# Patient Record
Sex: MALE | Race: BLACK OR AFRICAN AMERICAN | Employment: FULL TIME | URBAN - METROPOLITAN AREA
[De-identification: names, ages, dates, MRNs, and addresses within clinical notes are randomized per-mention and may not be internally consistent; named-entity substitution may affect disease eponyms.]

---

## 2021-09-21 ENCOUNTER — HOSPITAL ENCOUNTER (INPATIENT)
Age: 51
LOS: 3 days | Discharge: HOME | DRG: 439 | End: 2021-09-24
Attending: STUDENT IN AN ORGANIZED HEALTH CARE EDUCATION/TRAINING PROGRAM | Admitting: FAMILY MEDICINE

## 2021-09-21 DIAGNOSIS — K85.20 ALCOHOL-INDUCED ACUTE PANCREATITIS WITHOUT INFECTION OR NECROSIS: Primary | ICD-10-CM

## 2021-09-21 DIAGNOSIS — R45.851 SUICIDAL IDEATION: ICD-10-CM

## 2021-09-21 PROBLEM — K85.90 PANCREATITIS: Status: ACTIVE | Noted: 2021-09-21

## 2021-09-21 LAB
ALBUMIN SERPL-MCNC: 4.2 G/DL (ref 3.5–5)
ALBUMIN/GLOB SERPL: 1 {RATIO} (ref 1.1–2.2)
ALP SERPL-CCNC: 117 U/L (ref 45–117)
ALT SERPL-CCNC: 79 U/L (ref 12–78)
AMPHET UR QL SCN: NEGATIVE
ANION GAP SERPL CALC-SCNC: 16 MMOL/L (ref 5–15)
APPEARANCE UR: CLEAR
AST SERPL W P-5'-P-CCNC: 173 U/L (ref 15–37)
BACTERIA URNS QL MICRO: NEGATIVE /HPF
BARBITURATES UR QL SCN: NEGATIVE
BASOPHILS # BLD: 0 K/UL (ref 0–0.1)
BASOPHILS NFR BLD: 1 % (ref 0–1)
BENZODIAZ UR QL: NEGATIVE
BILIRUB SERPL-MCNC: 0.7 MG/DL (ref 0.2–1)
BILIRUB UR QL: NEGATIVE
BUN SERPL-MCNC: 7 MG/DL (ref 6–20)
BUN/CREAT SERPL: 6 (ref 12–20)
CA-I BLD-MCNC: 9.1 MG/DL (ref 8.5–10.1)
CANNABINOIDS UR QL SCN: NEGATIVE
CHLORIDE SERPL-SCNC: 91 MMOL/L (ref 97–108)
CO2 SERPL-SCNC: 24 MMOL/L (ref 21–32)
COCAINE UR QL SCN: NEGATIVE
COLOR UR: ABNORMAL
CREAT SERPL-MCNC: 1.09 MG/DL (ref 0.7–1.3)
DIFFERENTIAL METHOD BLD: NORMAL
DRUG SCRN COMMENT,DRGCM: NORMAL
EOSINOPHIL # BLD: 0 K/UL (ref 0–0.4)
EOSINOPHIL NFR BLD: 0 % (ref 0–7)
ERYTHROCYTE [DISTWIDTH] IN BLOOD BY AUTOMATED COUNT: 13.6 % (ref 11.5–14.5)
ETHANOL SERPL-MCNC: 206 MG/DL
GLOBULIN SER CALC-MCNC: 4.3 G/DL (ref 2–4)
GLUCOSE SERPL-MCNC: 144 MG/DL (ref 65–100)
GLUCOSE UR STRIP.AUTO-MCNC: NEGATIVE MG/DL
HCT VFR BLD AUTO: 38.7 % (ref 36.6–50.3)
HGB BLD-MCNC: 13.4 G/DL (ref 12.1–17)
HGB UR QL STRIP: ABNORMAL
IMM GRANULOCYTES # BLD AUTO: 0 K/UL (ref 0–0.04)
IMM GRANULOCYTES NFR BLD AUTO: 0 % (ref 0–0.5)
KETONES UR QL STRIP.AUTO: 20 MG/DL
LEUKOCYTE ESTERASE UR QL STRIP.AUTO: ABNORMAL
LIPASE SERPL-CCNC: 577 U/L (ref 73–393)
LYMPHOCYTES # BLD: 1.2 K/UL (ref 0.8–3.5)
LYMPHOCYTES NFR BLD: 18 % (ref 12–49)
MAGNESIUM SERPL-MCNC: 1.9 MG/DL (ref 1.6–2.4)
MCH RBC QN AUTO: 29.1 PG (ref 26–34)
MCHC RBC AUTO-ENTMCNC: 34.6 G/DL (ref 30–36.5)
MCV RBC AUTO: 84.1 FL (ref 80–99)
METHADONE UR QL: NEGATIVE
MONOCYTES # BLD: 0.5 K/UL (ref 0–1)
MONOCYTES NFR BLD: 7 % (ref 5–13)
NEUTS SEG # BLD: 4.9 K/UL (ref 1.8–8)
NEUTS SEG NFR BLD: 74 % (ref 32–75)
NITRITE UR QL STRIP.AUTO: NEGATIVE
NRBC # BLD: 0 K/UL (ref 0–0.01)
NRBC BLD-RTO: 0 PER 100 WBC
OPIATES UR QL: NEGATIVE
PCP UR QL: NEGATIVE
PH UR STRIP: 6 [PH] (ref 5–8)
PLATELET # BLD AUTO: 198 K/UL (ref 150–400)
PMV BLD AUTO: 10.2 FL (ref 8.9–12.9)
POTASSIUM SERPL-SCNC: 3.4 MMOL/L (ref 3.5–5.1)
PROT SERPL-MCNC: 8.5 G/DL (ref 6.4–8.2)
PROT UR STRIP-MCNC: 30 MG/DL
RBC # BLD AUTO: 4.6 M/UL (ref 4.1–5.7)
RBC #/AREA URNS HPF: ABNORMAL /HPF (ref 0–5)
SODIUM SERPL-SCNC: 131 MMOL/L (ref 136–145)
SP GR UR REFRACTOMETRY: 1.01 (ref 1–1.03)
UA: UC IF INDICATED,UAUC: ABNORMAL
UROBILINOGEN UR QL STRIP.AUTO: 2 EU/DL (ref 0.1–1)
WBC # BLD AUTO: 6.7 K/UL (ref 4.1–11.1)
WBC URNS QL MICRO: ABNORMAL /HPF (ref 0–4)

## 2021-09-21 PROCEDURE — 96374 THER/PROPH/DIAG INJ IV PUSH: CPT

## 2021-09-21 PROCEDURE — 80307 DRUG TEST PRSMV CHEM ANLYZR: CPT

## 2021-09-21 PROCEDURE — 74011250636 HC RX REV CODE- 250/636: Performed by: STUDENT IN AN ORGANIZED HEALTH CARE EDUCATION/TRAINING PROGRAM

## 2021-09-21 PROCEDURE — 80053 COMPREHEN METABOLIC PANEL: CPT

## 2021-09-21 PROCEDURE — 85025 COMPLETE CBC W/AUTO DIFF WBC: CPT

## 2021-09-21 PROCEDURE — 83690 ASSAY OF LIPASE: CPT

## 2021-09-21 PROCEDURE — 36415 COLL VENOUS BLD VENIPUNCTURE: CPT

## 2021-09-21 PROCEDURE — 82077 ASSAY SPEC XCP UR&BREATH IA: CPT

## 2021-09-21 PROCEDURE — 74011250637 HC RX REV CODE- 250/637: Performed by: STUDENT IN AN ORGANIZED HEALTH CARE EDUCATION/TRAINING PROGRAM

## 2021-09-21 PROCEDURE — 65270000029 HC RM PRIVATE

## 2021-09-21 PROCEDURE — 83735 ASSAY OF MAGNESIUM: CPT

## 2021-09-21 PROCEDURE — 74011000258 HC RX REV CODE- 258: Performed by: STUDENT IN AN ORGANIZED HEALTH CARE EDUCATION/TRAINING PROGRAM

## 2021-09-21 PROCEDURE — 99284 EMERGENCY DEPT VISIT MOD MDM: CPT

## 2021-09-21 PROCEDURE — 81001 URINALYSIS AUTO W/SCOPE: CPT

## 2021-09-21 RX ORDER — DEXTROSE MONOHYDRATE AND SODIUM CHLORIDE 5; .9 G/100ML; G/100ML
150 INJECTION, SOLUTION INTRAVENOUS CONTINUOUS
Status: DISCONTINUED | OUTPATIENT
Start: 2021-09-21 | End: 2021-09-22

## 2021-09-21 RX ORDER — POTASSIUM CHLORIDE 20 MEQ/1
40 TABLET, EXTENDED RELEASE ORAL
Status: COMPLETED | OUTPATIENT
Start: 2021-09-21 | End: 2021-09-21

## 2021-09-21 RX ORDER — FOLIC ACID 1 MG/1
1 TABLET ORAL DAILY
Status: DISCONTINUED | OUTPATIENT
Start: 2021-09-22 | End: 2021-09-24 | Stop reason: HOSPADM

## 2021-09-21 RX ADMIN — SODIUM CHLORIDE 1000 ML: 9 INJECTION, SOLUTION INTRAVENOUS at 18:28

## 2021-09-21 RX ADMIN — THIAMINE HYDROCHLORIDE 100 MG: 100 INJECTION, SOLUTION INTRAMUSCULAR; INTRAVENOUS at 18:26

## 2021-09-21 RX ADMIN — DEXTROSE AND SODIUM CHLORIDE 150 ML/HR: 5; 900 INJECTION, SOLUTION INTRAVENOUS at 20:45

## 2021-09-21 RX ADMIN — SODIUM CHLORIDE 1000 ML: 9 INJECTION, SOLUTION INTRAVENOUS at 23:35

## 2021-09-21 RX ADMIN — POTASSIUM CHLORIDE 40 MEQ: 1500 TABLET, EXTENDED RELEASE ORAL at 23:34
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
## 2021-09-22 ENCOUNTER — APPOINTMENT (OUTPATIENT)
Dept: CT IMAGING | Age: 51
DRG: 439 | End: 2021-09-22
Attending: FAMILY MEDICINE

## 2021-09-22 LAB
AMYLASE SERPL-CCNC: 61 U/L (ref 25–115)
BASOPHILS # BLD: 0 K/UL (ref 0–0.1)
BASOPHILS NFR BLD: 0 % (ref 0–1)
DIFFERENTIAL METHOD BLD: ABNORMAL
EOSINOPHIL # BLD: 0 K/UL (ref 0–0.4)
EOSINOPHIL NFR BLD: 1 % (ref 0–7)
ERYTHROCYTE [DISTWIDTH] IN BLOOD BY AUTOMATED COUNT: 14 % (ref 11.5–14.5)
HCT VFR BLD AUTO: 38.3 % (ref 36.6–50.3)
HGB BLD-MCNC: 12.6 G/DL (ref 12.1–17)
IMM GRANULOCYTES # BLD AUTO: 0 K/UL (ref 0–0.04)
IMM GRANULOCYTES NFR BLD AUTO: 1 % (ref 0–0.5)
LIPASE SERPL-CCNC: 717 U/L (ref 73–393)
LYMPHOCYTES # BLD: 0.9 K/UL (ref 0.8–3.5)
LYMPHOCYTES NFR BLD: 17 % (ref 12–49)
MCH RBC QN AUTO: 28.6 PG (ref 26–34)
MCHC RBC AUTO-ENTMCNC: 32.9 G/DL (ref 30–36.5)
MCV RBC AUTO: 86.8 FL (ref 80–99)
MONOCYTES # BLD: 0.4 K/UL (ref 0–1)
MONOCYTES NFR BLD: 8 % (ref 5–13)
NEUTS SEG # BLD: 4.2 K/UL (ref 1.8–8)
NEUTS SEG NFR BLD: 73 % (ref 32–75)
NRBC # BLD: 0 K/UL (ref 0–0.01)
NRBC BLD-RTO: 0 PER 100 WBC
PLATELET # BLD AUTO: 148 K/UL (ref 150–400)
PMV BLD AUTO: 10.6 FL (ref 8.9–12.9)
RBC # BLD AUTO: 4.41 M/UL (ref 4.1–5.7)
WBC # BLD AUTO: 5.6 K/UL (ref 4.1–11.1)

## 2021-09-22 PROCEDURE — 74011250637 HC RX REV CODE- 250/637: Performed by: PSYCHIATRY & NEUROLOGY

## 2021-09-22 PROCEDURE — 74176 CT ABD & PELVIS W/O CONTRAST: CPT

## 2021-09-22 PROCEDURE — 74011000250 HC RX REV CODE- 250: Performed by: FAMILY MEDICINE

## 2021-09-22 PROCEDURE — 83690 ASSAY OF LIPASE: CPT

## 2021-09-22 PROCEDURE — 65270000029 HC RM PRIVATE

## 2021-09-22 PROCEDURE — 74011000636 HC RX REV CODE- 636: Performed by: FAMILY MEDICINE

## 2021-09-22 PROCEDURE — 36415 COLL VENOUS BLD VENIPUNCTURE: CPT

## 2021-09-22 PROCEDURE — 85025 COMPLETE CBC W/AUTO DIFF WBC: CPT

## 2021-09-22 PROCEDURE — 74011250636 HC RX REV CODE- 250/636: Performed by: FAMILY MEDICINE

## 2021-09-22 PROCEDURE — 74011000258 HC RX REV CODE- 258: Performed by: STUDENT IN AN ORGANIZED HEALTH CARE EDUCATION/TRAINING PROGRAM

## 2021-09-22 PROCEDURE — 82150 ASSAY OF AMYLASE: CPT

## 2021-09-22 PROCEDURE — 74011250637 HC RX REV CODE- 250/637: Performed by: FAMILY MEDICINE

## 2021-09-22 RX ORDER — MIRTAZAPINE 15 MG/1
15 TABLET, FILM COATED ORAL
Status: DISCONTINUED | OUTPATIENT
Start: 2021-09-22 | End: 2021-09-24 | Stop reason: HOSPADM

## 2021-09-22 RX ORDER — CHOLECALCIFEROL (VITAMIN D3) 125 MCG
5 CAPSULE ORAL
Status: DISCONTINUED | OUTPATIENT
Start: 2021-09-22 | End: 2021-09-24 | Stop reason: HOSPADM

## 2021-09-22 RX ORDER — PSEUDOEPHED/ACETAMINOPHEN/CPM 30-500-2MG
2 TABLET ORAL
Status: DISCONTINUED | OUTPATIENT
Start: 2021-09-22 | End: 2021-09-24 | Stop reason: HOSPADM

## 2021-09-22 RX ORDER — ONDANSETRON 2 MG/ML
4 INJECTION INTRAMUSCULAR; INTRAVENOUS
Status: DISCONTINUED | OUTPATIENT
Start: 2021-09-22 | End: 2021-09-24 | Stop reason: HOSPADM

## 2021-09-22 RX ORDER — LORAZEPAM 2 MG/ML
2 INJECTION INTRAMUSCULAR
Status: DISCONTINUED | OUTPATIENT
Start: 2021-09-22 | End: 2021-09-24 | Stop reason: HOSPADM

## 2021-09-22 RX ORDER — PRAZOSIN HYDROCHLORIDE 1 MG/1
1 CAPSULE ORAL
Status: DISCONTINUED | OUTPATIENT
Start: 2021-09-22 | End: 2021-09-24 | Stop reason: HOSPADM

## 2021-09-22 RX ORDER — SERTRALINE HYDROCHLORIDE 25 MG/1
25 TABLET, FILM COATED ORAL DAILY
Status: DISCONTINUED | OUTPATIENT
Start: 2021-09-23 | End: 2021-09-24 | Stop reason: HOSPADM

## 2021-09-22 RX ADMIN — PRAZOSIN HYDROCHLORIDE 1 MG: 1 CAPSULE ORAL at 22:33

## 2021-09-22 RX ADMIN — MELATONIN TAB 5 MG 5 MG: 5 TAB at 03:10

## 2021-09-22 RX ADMIN — DIATRIZOATE MEGLUMINE AND DIATRIZOATE SODIUM 30 ML: 660; 100 LIQUID ORAL; RECTAL at 09:25

## 2021-09-22 RX ADMIN — DEXTROSE AND SODIUM CHLORIDE 150 ML/HR: 5; 900 INJECTION, SOLUTION INTRAVENOUS at 06:12

## 2021-09-22 RX ADMIN — MIRTAZAPINE 15 MG: 15 TABLET, FILM COATED ORAL at 22:33

## 2021-09-22 RX ADMIN — ONDANSETRON 4 MG: 2 INJECTION INTRAMUSCULAR; INTRAVENOUS at 09:25

## 2021-09-22 RX ADMIN — THIAMINE HYDROCHLORIDE: 100 INJECTION, SOLUTION INTRAMUSCULAR; INTRAVENOUS at 11:57

## 2021-09-22 NOTE — ED NOTES
Patient placed in green gown. Room is psyc safe. Patient belongings placed in nourishment room at station 3:  Black shirt, camo pants, black shoes, one black bag with belongings.
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
## 2021-09-22 NOTE — PROGRESS NOTES
Primary Nurse Elina Laughlin, MAKAYLA and Angelica Arreola RN performed a dual skin assessment on this patient No impairment noted  Anthony score is 22.

## 2021-09-22 NOTE — ED NOTES
.. TRANSFER - OUT REPORT:    Verbal report given to MAKAYLA Antoine(name) on José Manuel Hickey  being transferred to [facility](unit) for routine progression of care       Report consisted of patients Situation, Background, Assessment and   Recommendations(SBAR). Information from the following report(s) SBAR was reviewed with the receiving nurse. Lines:   Peripheral IV 09/21/21 Right Antecubital (Active)        Opportunity for questions and clarification was provided.       Patient transported with:   Registered Nurse
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
## 2021-09-22 NOTE — PROGRESS NOTES
Bedside and Verbal shift change report given to Renee Sauer RN (oncoming nurse) by Randa Hadley RN (offgoing nurse). Report included the following information SBAR, Kardex, Intake/Output, Recent Results, Med Rec Status and Cardiac Rhythm NSR.

## 2021-09-22 NOTE — H&P
History and Physical    NAME: Iliana Trujillo   :  1970   MRN:  [id number]     Date/Time:  2021 8:40 AM    Patient PCP: None  ______________________________________________________________________             Subjective:     CHIEF COMPLAINT:     Suicidal ideation    HISTORY OF PRESENT ILLNESS:       Patient is a 45y.o. year old male history of heavy alcohol use M to the ER with depression and suicidal ideation also history of posttraumatic stress disorder recently because of depression he is drinking heavily liquor every suicidal ideation came to emergency room seen by the ER physician initially plan for admitted to psych floor as patient lipase is elevated suspect acute pancreatitis patient was admitted to medical floor      No abdominal CT scan was ordered    History reviewed. No pertinent past medical history. None      History reviewed. No pertinent surgical history. Social History     Tobacco Use    Smoking status: Current Some Day Smoker    Smokeless tobacco: Never Used    Tobacco comment: cigar   Substance Use Topics    Alcohol use: Yes     Comment: daily         History reviewed. No pertinent family history. No Known Allergies     Prior to Admission medications    Not on File         Current Facility-Administered Medications:     melatonin tablet 5 mg, 5 mg, Oral, QHS PRN, Aminta Suárez MD, 5 mg at 21 0310    ondansetron (ZOFRAN) injection 4 mg, 4 mg, IntraVENous, Q6H PRN, Aminta Suárez MD    0.9% sodium chloride 1,000 mL with mvi (adult no. 4 with vit K) 10 mL, thiamine 968 mg, folic acid 1 mg infusion, , IntraVENous, Q24H, Aminta Suárez MD    LORazepam (ATIVAN) injection 2 mg, 2 mg, IntraVENous, Q4H PRN, Aminta Suárez MD    folic acid (FOLVITE) tablet 1 mg, 1 mg, Oral, DAILY, Kenroy Frank MD    LAB DATA REVIEWED:    Recent Results (from the past 24 hour(s))   URINALYSIS W/ REFLEX CULTURE    Collection Time: 21  5:00 PM    Specimen: Urine   Result Value Ref Range    Color Yellow/Straw      Appearance Clear Clear      Specific gravity 1.010 1.003 - 1.030      pH (UA) 6.0 5.0 - 8.0      Protein 30 (A) Negative mg/dL    Glucose Negative Negative mg/dL    Ketone 20 (A) Negative mg/dL    Bilirubin Negative Negative      Blood Small (A) Negative      Urobilinogen 2.0 (H) 0.1 - 1.0 EU/dL    Nitrites Negative Negative      Leukocyte Esterase Trace (A) Negative      UA:UC IF INDICATED Culture not indicated by UA result Culture not indicated by UA result      WBC 0-4 0 - 4 /hpf    RBC 0-5 0 - 5 /hpf    Bacteria Negative Negative /hpf   DRUG SCREEN, URINE    Collection Time: 09/21/21  5:00 PM   Result Value Ref Range    AMPHETAMINES Negative Negative      BARBITURATES Negative Negative      BENZODIAZEPINES Negative Negative      COCAINE Negative Negative      METHADONE Negative Negative      OPIATES Negative Negative      PCP(PHENCYCLIDINE) Negative Negative      THC (TH-CANNABINOL) Negative Negative      Drug screen comment        This test is a screen for drugs of abuse in a medical setting only (i.e., they are unconfirmed results and as such must not be used for non-medical purposes, e.g.,employment testing, legal testing). Due to its inherent nature, false positive (FP) and false negative (FN) results may be obtained. Therefore, if necessary for medical care, recommend confirmation of positive findings by GC/MS.    CBC WITH AUTOMATED DIFF    Collection Time: 09/21/21  5:05 PM   Result Value Ref Range    WBC 6.7 4.1 - 11.1 K/uL    RBC 4.60 4.10 - 5.70 M/uL    HGB 13.4 12.1 - 17.0 g/dL    HCT 38.7 36.6 - 50.3 %    MCV 84.1 80.0 - 99.0 FL    MCH 29.1 26.0 - 34.0 PG    MCHC 34.6 30.0 - 36.5 g/dL    RDW 13.6 11.5 - 14.5 %    PLATELET 169 049 - 642 K/uL    MPV 10.2 8.9 - 12.9 FL    NRBC 0.0 0.0  WBC    ABSOLUTE NRBC 0.00 0.00 - 0.01 K/uL    NEUTROPHILS 74 32 - 75 %    LYMPHOCYTES 18 12 - 49 %    MONOCYTES 7 5 - 13 %    EOSINOPHILS 0 0 - 7 %    BASOPHILS 1 0 - 1 % IMMATURE GRANULOCYTES 0 0 - 0.5 %    ABS. NEUTROPHILS 4.9 1.8 - 8.0 K/UL    ABS. LYMPHOCYTES 1.2 0.8 - 3.5 K/UL    ABS. MONOCYTES 0.5 0.0 - 1.0 K/UL    ABS. EOSINOPHILS 0.0 0.0 - 0.4 K/UL    ABS. BASOPHILS 0.0 0.0 - 0.1 K/UL    ABS. IMM. GRANS. 0.0 0.00 - 0.04 K/UL    DF AUTOMATED     METABOLIC PANEL, COMPREHENSIVE    Collection Time: 09/21/21  5:05 PM   Result Value Ref Range    Sodium 131 (L) 136 - 145 mmol/L    Potassium 3.4 (L) 3.5 - 5.1 mmol/L    Chloride 91 (L) 97 - 108 mmol/L    CO2 24 21 - 32 mmol/L    Anion gap 16 (H) 5 - 15 mmol/L    Glucose 144 (H) 65 - 100 mg/dL    BUN 7 6 - 20 mg/dL    Creatinine 1.09 0.70 - 1.30 mg/dL    BUN/Creatinine ratio 6 (L) 12 - 20      GFR est AA >60 >60 ml/min/1.73m2    GFR est non-AA >60 >60 ml/min/1.73m2    Calcium 9.1 8.5 - 10.1 mg/dL    Bilirubin, total 0.7 0.2 - 1.0 mg/dL    AST (SGOT) 173 (H) 15 - 37 U/L    ALT (SGPT) 79 (H) 12 - 78 U/L    Alk. phosphatase 117 45 - 117 U/L    Protein, total 8.5 (H) 6.4 - 8.2 g/dL    Albumin 4.2 3.5 - 5.0 g/dL    Globulin 4.3 (H) 2.0 - 4.0 g/dL    A-G Ratio 1.0 (L) 1.1 - 2.2     ETHYL ALCOHOL    Collection Time: 09/21/21  5:05 PM   Result Value Ref Range    ALCOHOL(ETHYL),SERUM 206 (H) <10 mg/dL   LIPASE    Collection Time: 09/21/21  5:20 PM   Result Value Ref Range    Lipase 577 (H) 73 - 393 U/L   MAGNESIUM    Collection Time: 09/21/21  5:20 PM   Result Value Ref Range    Magnesium 1.9 1.6 - 2.4 mg/dL       XR Results (most recent):  No results found for this or any previous visit. CT ABD PELV WO CONT    (Results Pending)        Review of Systems:  Constitutional: Negative for chills and fever. HENT: Negative. Eyes: Negative. Respiratory: Negative. Cardiovascular: Negative. Gastrointestinal: Nausea vomiting  Skin: Negative. Neurological: Negative.       Objective:   VITALS:    Visit Vitals  BP (!) 154/95 (BP 1 Location: Left upper arm, BP Patient Position: At rest;Semi fowlers)   Pulse 100   Temp 98.7 °F (37.1 °C) Resp 18   Ht 6' 1.5\" (1.867 m)   Wt 117.9 kg (259 lb 14.8 oz)   SpO2 98%   BMI 33.83 kg/m²       Physical Exam:   Constitutional: pt is oriented to person, place, and time. HENT:   Head: Normocephalic and atraumatic. Eyes: Pupils are equal, round, and reactive to light. EOM are normal.   Cardiovascular: Normal rate, regular rhythm and normal heart sounds. Pulmonary/Chest: Breath sounds normal. No wheezes. No rales. Exhibits no tenderness. Abdominal: Soft. Bowel sounds are normal. There is no abdominal tenderness. There is no rebound and no guarding. Musculoskeletal: Normal range of motion. Neurological: pt is alert and oriented to person, place, and time. Alert. Normal strength. No cranial nerve deficit or sensory deficit. Displays a negative Romberg sign.         ASSESSMENT & PLAN:    Suicidal ideation   alcohol withdrawals  Intractable nausea vomiting likely from alcohol withdrawal  Pancreatitis questionable      Start on banana bag   Ativan 2 mg IV every 4 hours as needed for withdrawals  Psychiatric consult for depression and suicidal  Patient placed a one-to-one   CT scan of the abdomen and the pelvis        ________________________________________________________________________    Signed: [id number] Jarad Carrera MD

## 2021-09-23 LAB
ALBUMIN SERPL-MCNC: 3.3 G/DL (ref 3.5–5)
ALBUMIN/GLOB SERPL: 0.9 {RATIO} (ref 1.1–2.2)
ALP SERPL-CCNC: 116 U/L (ref 45–117)
ALT SERPL-CCNC: 85 U/L (ref 12–78)
AMYLASE SERPL-CCNC: 63 U/L (ref 25–115)
ANION GAP SERPL CALC-SCNC: 11 MMOL/L (ref 5–15)
AST SERPL W P-5'-P-CCNC: 183 U/L (ref 15–37)
BASOPHILS # BLD: 0 K/UL (ref 0–0.1)
BASOPHILS NFR BLD: 0 % (ref 0–1)
BILIRUB SERPL-MCNC: 0.6 MG/DL (ref 0.2–1)
BUN SERPL-MCNC: 3 MG/DL (ref 6–20)
BUN/CREAT SERPL: 3 (ref 12–20)
CA-I BLD-MCNC: 9 MG/DL (ref 8.5–10.1)
CHLORIDE SERPL-SCNC: 104 MMOL/L (ref 97–108)
CO2 SERPL-SCNC: 26 MMOL/L (ref 21–32)
CREAT SERPL-MCNC: 0.9 MG/DL (ref 0.7–1.3)
DIFFERENTIAL METHOD BLD: ABNORMAL
EOSINOPHIL # BLD: 0.1 K/UL (ref 0–0.4)
EOSINOPHIL NFR BLD: 2 % (ref 0–7)
ERYTHROCYTE [DISTWIDTH] IN BLOOD BY AUTOMATED COUNT: 14.4 % (ref 11.5–14.5)
GLOBULIN SER CALC-MCNC: 3.7 G/DL (ref 2–4)
GLUCOSE SERPL-MCNC: 106 MG/DL (ref 65–100)
HCT VFR BLD AUTO: 38.1 % (ref 36.6–50.3)
HGB BLD-MCNC: 12.4 G/DL (ref 12.1–17)
IMM GRANULOCYTES # BLD AUTO: 0 K/UL (ref 0–0.04)
IMM GRANULOCYTES NFR BLD AUTO: 1 % (ref 0–0.5)
LIPASE SERPL-CCNC: 556 U/L (ref 73–393)
LYMPHOCYTES # BLD: 1.2 K/UL (ref 0.8–3.5)
LYMPHOCYTES NFR BLD: 24 % (ref 12–49)
MCH RBC QN AUTO: 28.9 PG (ref 26–34)
MCHC RBC AUTO-ENTMCNC: 32.5 G/DL (ref 30–36.5)
MCV RBC AUTO: 88.8 FL (ref 80–99)
MONOCYTES # BLD: 0.4 K/UL (ref 0–1)
MONOCYTES NFR BLD: 7 % (ref 5–13)
NEUTS SEG # BLD: 3.5 K/UL (ref 1.8–8)
NEUTS SEG NFR BLD: 66 % (ref 32–75)
NRBC # BLD: 0 K/UL (ref 0–0.01)
NRBC BLD-RTO: 0 PER 100 WBC
PLATELET # BLD AUTO: 153 K/UL (ref 150–400)
PMV BLD AUTO: 11.1 FL (ref 8.9–12.9)
POTASSIUM SERPL-SCNC: 3.5 MMOL/L (ref 3.5–5.1)
PROT SERPL-MCNC: 7 G/DL (ref 6.4–8.2)
RBC # BLD AUTO: 4.29 M/UL (ref 4.1–5.7)
SODIUM SERPL-SCNC: 141 MMOL/L (ref 136–145)
WBC # BLD AUTO: 5.3 K/UL (ref 4.1–11.1)

## 2021-09-23 PROCEDURE — 65270000029 HC RM PRIVATE

## 2021-09-23 PROCEDURE — 85025 COMPLETE CBC W/AUTO DIFF WBC: CPT

## 2021-09-23 PROCEDURE — 74011000250 HC RX REV CODE- 250: Performed by: FAMILY MEDICINE

## 2021-09-23 PROCEDURE — 74011250637 HC RX REV CODE- 250/637: Performed by: STUDENT IN AN ORGANIZED HEALTH CARE EDUCATION/TRAINING PROGRAM

## 2021-09-23 PROCEDURE — 83690 ASSAY OF LIPASE: CPT

## 2021-09-23 PROCEDURE — 36415 COLL VENOUS BLD VENIPUNCTURE: CPT

## 2021-09-23 PROCEDURE — 74011250636 HC RX REV CODE- 250/636: Performed by: FAMILY MEDICINE

## 2021-09-23 PROCEDURE — 74011250637 HC RX REV CODE- 250/637: Performed by: PSYCHIATRY & NEUROLOGY

## 2021-09-23 PROCEDURE — 74011250637 HC RX REV CODE- 250/637: Performed by: FAMILY MEDICINE

## 2021-09-23 PROCEDURE — 82150 ASSAY OF AMYLASE: CPT

## 2021-09-23 PROCEDURE — 80053 COMPREHEN METABOLIC PANEL: CPT

## 2021-09-23 RX ORDER — AMLODIPINE BESYLATE 5 MG/1
5 TABLET ORAL ONCE
Status: COMPLETED | OUTPATIENT
Start: 2021-09-23 | End: 2021-09-23

## 2021-09-23 RX ORDER — AMLODIPINE BESYLATE 5 MG/1
10 TABLET ORAL DAILY
Status: DISCONTINUED | OUTPATIENT
Start: 2021-09-24 | End: 2021-09-24 | Stop reason: HOSPADM

## 2021-09-23 RX ORDER — AMLODIPINE BESYLATE 5 MG/1
5 TABLET ORAL DAILY
Status: DISCONTINUED | OUTPATIENT
Start: 2021-09-23 | End: 2021-09-23

## 2021-09-23 RX ADMIN — MIRTAZAPINE 15 MG: 15 TABLET, FILM COATED ORAL at 22:25

## 2021-09-23 RX ADMIN — AMLODIPINE BESYLATE 5 MG: 5 TABLET ORAL at 13:26

## 2021-09-23 RX ADMIN — AMLODIPINE BESYLATE 5 MG: 5 TABLET ORAL at 17:35

## 2021-09-23 RX ADMIN — FOLIC ACID 1 MG: 1 TABLET ORAL at 10:19

## 2021-09-23 RX ADMIN — THIAMINE HYDROCHLORIDE: 100 INJECTION, SOLUTION INTRAMUSCULAR; INTRAVENOUS at 10:59

## 2021-09-23 RX ADMIN — SERTRALINE 25 MG: 25 TABLET, FILM COATED ORAL at 10:19

## 2021-09-23 RX ADMIN — PRAZOSIN HYDROCHLORIDE 1 MG: 1 CAPSULE ORAL at 22:25

## 2021-09-23 NOTE — PROGRESS NOTES
General Daily Progress Note          Patient Name:   Jony Sawant       YOB: 1970       Age:  45 y.o. Admit Date: 9/21/2021      Subjective:   Patient is a 45y.o. year old male history of heavy alcohol use M to the ER with depression and suicidal ideation also history of posttraumatic stress disorder recently because of depression he is drinking heavily liquor every suicidal ideation came to emergency room seen by the ER physician initially plan for admitted to psych floor as patient lipase is elevated suspect acute pancreatitis patient was admitted to medical floor    Patient denies any abdominal pain no nausea no vomiting           Objective:     Visit Vitals  BP (!) 161/103 (BP 1 Location: Left lower arm, BP Patient Position: At rest)   Pulse 96   Temp 97.6 °F (36.4 °C)   Resp 18   Ht 6' 1.5\" (1.867 m)   Wt 117.9 kg (259 lb 14.8 oz)   SpO2 99%   BMI 33.83 kg/m²        Recent Results (from the past 24 hour(s))   AMYLASE    Collection Time: 09/22/21 10:40 AM   Result Value Ref Range    Amylase 61 25 - 115 U/L   LIPASE    Collection Time: 09/22/21 10:40 AM   Result Value Ref Range    Lipase 717 (H) 73 - 393 U/L   CBC WITH AUTOMATED DIFF    Collection Time: 09/22/21 10:40 AM   Result Value Ref Range    WBC 5.6 4.1 - 11.1 K/uL    RBC 4.41 4.10 - 5.70 M/uL    HGB 12.6 12.1 - 17.0 g/dL    HCT 38.3 36.6 - 50.3 %    MCV 86.8 80.0 - 99.0 FL    MCH 28.6 26.0 - 34.0 PG    MCHC 32.9 30.0 - 36.5 g/dL    RDW 14.0 11.5 - 14.5 %    PLATELET 035 (L) 434 - 400 K/uL    MPV 10.6 8.9 - 12.9 FL    NRBC 0.0 0.0  WBC    ABSOLUTE NRBC 0.00 0.00 - 0.01 K/uL    NEUTROPHILS 73 32 - 75 %    LYMPHOCYTES 17 12 - 49 %    MONOCYTES 8 5 - 13 %    EOSINOPHILS 1 0 - 7 %    BASOPHILS 0 0 - 1 %    IMMATURE GRANULOCYTES 1 (H) 0 - 0.5 %    ABS. NEUTROPHILS 4.2 1.8 - 8.0 K/UL    ABS. LYMPHOCYTES 0.9 0.8 - 3.5 K/UL    ABS. MONOCYTES 0.4 0.0 - 1.0 K/UL    ABS. EOSINOPHILS 0.0 0.0 - 0.4 K/UL    ABS.  BASOPHILS 0.0 0.0 - 0.1 K/UL    ABS. IMM. GRANS. 0.0 0.00 - 0.04 K/UL    DF AUTOMATED     METABOLIC PANEL, COMPREHENSIVE    Collection Time: 09/23/21  7:56 AM   Result Value Ref Range    Sodium 141 136 - 145 mmol/L    Potassium 3.5 3.5 - 5.1 mmol/L    Chloride 104 97 - 108 mmol/L    CO2 26 21 - 32 mmol/L    Anion gap 11 5 - 15 mmol/L    Glucose 106 (H) 65 - 100 mg/dL    BUN 3 (L) 6 - 20 mg/dL    Creatinine 0.90 0.70 - 1.30 mg/dL    BUN/Creatinine ratio 3 (L) 12 - 20      GFR est AA >60 >60 ml/min/1.73m2    GFR est non-AA >60 >60 ml/min/1.73m2    Calcium 9.0 8.5 - 10.1 mg/dL    Bilirubin, total 0.6 0.2 - 1.0 mg/dL    AST (SGOT) 183 (H) 15 - 37 U/L    ALT (SGPT) 85 (H) 12 - 78 U/L    Alk. phosphatase 116 45 - 117 U/L    Protein, total 7.0 6.4 - 8.2 g/dL    Albumin 3.3 (L) 3.5 - 5.0 g/dL    Globulin 3.7 2.0 - 4.0 g/dL    A-G Ratio 0.9 (L) 1.1 - 2.2       [unfilled]      Review of Systems    Constitutional: Negative for chills and fever. HENT: Negative. Eyes: Negative. Respiratory: Negative. Cardiovascular: Negative. Gastrointestinal: Negative for abdominal pain and nausea. Skin: Negative. Neurological: Negative. Physical Exam:      Constitutional: pt is oriented to person, place, and time. HENT:   Head: Normocephalic and atraumatic. Eyes: Pupils are equal, round, and reactive to light. EOM are normal.   Cardiovascular: Normal rate, regular rhythm and normal heart sounds. Pulmonary/Chest: Breath sounds normal. No wheezes. No rales. Exhibits no tenderness. Abdominal: Soft. Bowel sounds are normal. There is no abdominal tenderness. There is no rebound and no guarding. Musculoskeletal: Normal range of motion. Neurological: pt is alert and oriented to person, place, and time. CT ABD PELV WO CONT   Final Result   1. Inflammation about the pancreas head and less so about the pancreas tail,   consistent with provided history of pancreatitis. 2. Fatty infiltrated liver.    3. Thickening versus nondistention of the distal left colon, proximal sigmoid   colon. Correlate for any symptoms of colitis. Recent Results (from the past 24 hour(s))   AMYLASE    Collection Time: 09/22/21 10:40 AM   Result Value Ref Range    Amylase 61 25 - 115 U/L   LIPASE    Collection Time: 09/22/21 10:40 AM   Result Value Ref Range    Lipase 717 (H) 73 - 393 U/L   CBC WITH AUTOMATED DIFF    Collection Time: 09/22/21 10:40 AM   Result Value Ref Range    WBC 5.6 4.1 - 11.1 K/uL    RBC 4.41 4.10 - 5.70 M/uL    HGB 12.6 12.1 - 17.0 g/dL    HCT 38.3 36.6 - 50.3 %    MCV 86.8 80.0 - 99.0 FL    MCH 28.6 26.0 - 34.0 PG    MCHC 32.9 30.0 - 36.5 g/dL    RDW 14.0 11.5 - 14.5 %    PLATELET 872 (L) 569 - 400 K/uL    MPV 10.6 8.9 - 12.9 FL    NRBC 0.0 0.0  WBC    ABSOLUTE NRBC 0.00 0.00 - 0.01 K/uL    NEUTROPHILS 73 32 - 75 %    LYMPHOCYTES 17 12 - 49 %    MONOCYTES 8 5 - 13 %    EOSINOPHILS 1 0 - 7 %    BASOPHILS 0 0 - 1 %    IMMATURE GRANULOCYTES 1 (H) 0 - 0.5 %    ABS. NEUTROPHILS 4.2 1.8 - 8.0 K/UL    ABS. LYMPHOCYTES 0.9 0.8 - 3.5 K/UL    ABS. MONOCYTES 0.4 0.0 - 1.0 K/UL    ABS. EOSINOPHILS 0.0 0.0 - 0.4 K/UL    ABS. BASOPHILS 0.0 0.0 - 0.1 K/UL    ABS. IMM. GRANS. 0.0 0.00 - 0.04 K/UL    DF AUTOMATED     METABOLIC PANEL, COMPREHENSIVE    Collection Time: 09/23/21  7:56 AM   Result Value Ref Range    Sodium 141 136 - 145 mmol/L    Potassium 3.5 3.5 - 5.1 mmol/L    Chloride 104 97 - 108 mmol/L    CO2 26 21 - 32 mmol/L    Anion gap 11 5 - 15 mmol/L    Glucose 106 (H) 65 - 100 mg/dL    BUN 3 (L) 6 - 20 mg/dL    Creatinine 0.90 0.70 - 1.30 mg/dL    BUN/Creatinine ratio 3 (L) 12 - 20      GFR est AA >60 >60 ml/min/1.73m2    GFR est non-AA >60 >60 ml/min/1.73m2    Calcium 9.0 8.5 - 10.1 mg/dL    Bilirubin, total 0.6 0.2 - 1.0 mg/dL    AST (SGOT) 183 (H) 15 - 37 U/L    ALT (SGPT) 85 (H) 12 - 78 U/L    Alk.  phosphatase 116 45 - 117 U/L    Protein, total 7.0 6.4 - 8.2 g/dL    Albumin 3.3 (L) 3.5 - 5.0 g/dL    Globulin 3.7 2.0 - 4.0 g/dL    A-G Ratio 0.9 (L) 1.1 - 2.2         Results     ** No results found for the last 336 hours. **           Labs:     Recent Labs     09/22/21  1040 09/21/21  1705   WBC 5.6 6.7   HGB 12.6 13.4   HCT 38.3 38.7   * 198     Recent Labs     09/23/21 0756 09/21/21 1720 09/21/21  1705     --  131*   K 3.5  --  3.4*     --  91*   CO2 26  --  24   BUN 3*  --  7   CREA 0.90  --  1.09   *  --  144*   CA 9.0  --  9.1   MG  --  1.9  --      Recent Labs     09/23/21 0756 09/22/21 1040 09/21/21 1720 09/21/21  1705   ALT 85*  --   --  79*     --   --  117   TBILI 0.6  --   --  0.7   TP 7.0  --   --  8.5*   ALB 3.3*  --   --  4.2   GLOB 3.7  --   --  4.3*   AML  --  61  --   --    LPSE  --  717* 577*  --      No results for input(s): INR, PTP, APTT, INREXT in the last 72 hours. No results for input(s): FE, TIBC, PSAT, FERR in the last 72 hours. No results found for: FOL, RBCF   No results for input(s): PH, PCO2, PO2 in the last 72 hours. No results for input(s): CPK, CKNDX, TROIQ in the last 72 hours.     No lab exists for component: CPKMB  No results found for: CHOL, CHOLX, CHLST, CHOLV, HDL, HDLP, LDL, LDLC, DLDLP, TGLX, TRIGL, TRIGP, CHHD, CHHDX  No results found for: GLUCPOC  Lab Results   Component Value Date/Time    Color Yellow/Straw 09/21/2021 05:00 PM    Appearance Clear 09/21/2021 05:00 PM    Specific gravity 1.010 09/21/2021 05:00 PM    pH (UA) 6.0 09/21/2021 05:00 PM    Protein 30 (A) 09/21/2021 05:00 PM    Glucose Negative 09/21/2021 05:00 PM    Ketone 20 (A) 09/21/2021 05:00 PM    Bilirubin Negative 09/21/2021 05:00 PM    Urobilinogen 2.0 (H) 09/21/2021 05:00 PM    Nitrites Negative 09/21/2021 05:00 PM    Leukocyte Esterase Trace (A) 09/21/2021 05:00 PM    Bacteria Negative 09/21/2021 05:00 PM    WBC 0-4 09/21/2021 05:00 PM    RBC 0-5 09/21/2021 05:00 PM         Assessment:        Suicidal ideation   alcohol withdrawals  Intractable nausea vomiting likely from alcohol withdrawal  Acute pancreatitis      Plan:       Continue banana bag  Continue Remeron and prazosin and Zoloft      Continue detox protocol    Follow-up with psychiatric recommendation one-to-one      Current Facility-Administered Medications:     melatonin tablet 5 mg, 5 mg, Oral, QHS PRN, Aminta Suárez MD, 5 mg at 09/22/21 0310    ondansetron (ZOFRAN) injection 4 mg, 4 mg, IntraVENous, Q6H PRN, Aminta Suárez MD, 4 mg at 09/22/21 0925    0.9% sodium chloride 1,000 mL with mvi (adult no. 4 with vit K) 10 mL, thiamine 185 mg, folic acid 1 mg infusion, , IntraVENous, Q24H, Aminta Suárez MD, Last Rate: 125 mL/hr at 09/22/21 1157, New Bag at 09/22/21 1157    LORazepam (ATIVAN) injection 2 mg, 2 mg, IntraVENous, Q4H PRN, Aminta Suárez MD    sertraline (ZOLOFT) tablet 25 mg, 25 mg, Oral, DAILY, Dick Winston MD    mirtazapine (REMERON) tablet 15 mg, 15 mg, Oral, QHS, Terrell Kirk MD, 15 mg at 09/22/21 2233    prazosin (MINIPRESS) capsule 1 mg, 1 mg, Oral, QHS, Terrell Kirk MD, 1 mg at 09/22/21 2233    loperamide (IMODIUM) 1 mg/7.5 mL oral solution 2 mg, 2 mg, Oral, QID PRN, Latisha Suárez MD    folic acid (FOLVITE) tablet 1 mg, 1 mg, Oral, DAILY, Kenroy Henderson MD

## 2021-09-23 NOTE — PROGRESS NOTES
Spoke with Dr. Buster Keen he stated the pt is cleared for discharge from his point of view. He stated the pt is no longer a 1:1 and does not need a sitter. Called and notified the nursing supervisor.

## 2021-09-23 NOTE — PROGRESS NOTES
Problem: General Infection Care Plan (Adult and Pediatric)  Goal: Improvement in signs and symptoms of infection  Outcome: Progressing Towards Goal     Problem: Suicide  Goal: *STG: Remains safe in hospital  Outcome: Progressing Towards Goal     Problem: Falls - Risk of  Goal: *Absence of Falls  Description: Document Antoinette Fall Risk and appropriate interventions in the flowsheet.   Outcome: Progressing Towards Goal  Note: Fall Risk Interventions:            Medication Interventions: Teach patient to arise slowly

## 2021-09-23 NOTE — PROGRESS NOTES
Reason for Admission: Pancreatitis                     RUR Score: 6%                    Plan for utilizing home health:  No plans at this time        PCP: First and Last name:  None     Name of Practice: na   Are you a current patient: Yes/No: na   Approximate date of last visit: na   Can you participate in a virtual visit with your PCP:                     Current Advanced Directive/Advance Care Plan: Full Code      Healthcare Decision Maker:   Click here to complete [place] including selection of the Healthcare Decision Maker Relationship (ie \"Primary\")             Psychiatric Decision Maker: Maryjane Fleming - Mother - [phone]                  Transition of Care Plan:                    Met with patient at bedside. Patient is lying in bed quietly. Lives alone in a 1 story house. Has his own transportation to get to and from doctors visits. Does not use any DMEs now and no plans to start using. Has not used home health in the past. Does not have a PCP and has not seen one in a long while. DC plan is home self.

## 2021-09-24 VITALS
HEIGHT: 74 IN | DIASTOLIC BLOOD PRESSURE: 92 MMHG | SYSTOLIC BLOOD PRESSURE: 153 MMHG | RESPIRATION RATE: 14 BRPM | OXYGEN SATURATION: 97 % | HEART RATE: 102 BPM | WEIGHT: 259.92 LBS | BODY MASS INDEX: 33.36 KG/M2 | TEMPERATURE: 99 F

## 2021-09-24 LAB
ALBUMIN SERPL-MCNC: 3.2 G/DL (ref 3.5–5)
ALBUMIN/GLOB SERPL: 1 {RATIO} (ref 1.1–2.2)
ALP SERPL-CCNC: 112 U/L (ref 45–117)
ALT SERPL-CCNC: 86 U/L (ref 12–78)
ANION GAP SERPL CALC-SCNC: 10 MMOL/L (ref 5–15)
AST SERPL W P-5'-P-CCNC: 154 U/L (ref 15–37)
BILIRUB SERPL-MCNC: 0.4 MG/DL (ref 0.2–1)
BUN SERPL-MCNC: 2 MG/DL (ref 6–20)
BUN/CREAT SERPL: 2 (ref 12–20)
CA-I BLD-MCNC: 8.9 MG/DL (ref 8.5–10.1)
CHLORIDE SERPL-SCNC: 103 MMOL/L (ref 97–108)
CO2 SERPL-SCNC: 27 MMOL/L (ref 21–32)
CREAT SERPL-MCNC: 0.82 MG/DL (ref 0.7–1.3)
GLOBULIN SER CALC-MCNC: 3.3 G/DL (ref 2–4)
GLUCOSE SERPL-MCNC: 98 MG/DL (ref 65–100)
LIPASE SERPL-CCNC: 398 U/L (ref 73–393)
POTASSIUM SERPL-SCNC: 3.5 MMOL/L (ref 3.5–5.1)
PROT SERPL-MCNC: 6.5 G/DL (ref 6.4–8.2)
SODIUM SERPL-SCNC: 140 MMOL/L (ref 136–145)

## 2021-09-24 PROCEDURE — 74011250637 HC RX REV CODE- 250/637: Performed by: PSYCHIATRY & NEUROLOGY

## 2021-09-24 PROCEDURE — 74011250637 HC RX REV CODE- 250/637: Performed by: FAMILY MEDICINE

## 2021-09-24 PROCEDURE — 36415 COLL VENOUS BLD VENIPUNCTURE: CPT

## 2021-09-24 PROCEDURE — 83690 ASSAY OF LIPASE: CPT

## 2021-09-24 PROCEDURE — 74011250637 HC RX REV CODE- 250/637: Performed by: STUDENT IN AN ORGANIZED HEALTH CARE EDUCATION/TRAINING PROGRAM

## 2021-09-24 PROCEDURE — 80053 COMPREHEN METABOLIC PANEL: CPT

## 2021-09-24 RX ORDER — PRAZOSIN HYDROCHLORIDE 1 MG/1
1 CAPSULE ORAL
Qty: 30 CAPSULE | Refills: 0 | Status: SHIPPED | OUTPATIENT
Start: 2021-09-24

## 2021-09-24 RX ORDER — SERTRALINE HYDROCHLORIDE 25 MG/1
25 TABLET, FILM COATED ORAL DAILY
Qty: 30 TABLET | Refills: 0 | Status: SHIPPED | OUTPATIENT
Start: 2021-09-24

## 2021-09-24 RX ORDER — FOLIC ACID 1 MG/1
1 TABLET ORAL DAILY
Qty: 30 TABLET | Refills: 0 | Status: SHIPPED | OUTPATIENT
Start: 2021-09-24

## 2021-09-24 RX ORDER — MIRTAZAPINE 15 MG/1
15 TABLET, FILM COATED ORAL
Qty: 30 TABLET | Refills: 0 | Status: SHIPPED | OUTPATIENT
Start: 2021-09-24

## 2021-09-24 RX ORDER — AMLODIPINE BESYLATE 10 MG/1
10 TABLET ORAL DAILY
Qty: 30 TABLET | Refills: 0 | Status: SHIPPED | OUTPATIENT
Start: 2021-09-24

## 2021-09-24 RX ADMIN — FOLIC ACID 1 MG: 1 TABLET ORAL at 09:43

## 2021-09-24 RX ADMIN — SERTRALINE 25 MG: 25 TABLET, FILM COATED ORAL at 09:43

## 2021-09-24 RX ADMIN — AMLODIPINE BESYLATE 10 MG: 5 TABLET ORAL at 09:42

## 2021-09-24 NOTE — CONSULTS
[address] [place] [address]    Name:  April Person  MR#:  [id number]  :  1970  ACCOUNT #:  [de-identified]  DATE OF SERVICE:  2021    The patient is seen for followup, and he is eating and taking a soft diet, liquid diet and slept well and not suicidal.  He says he has no belly pain. He says he may be going home today. He did want me to call his mother, gave me a telephone number. When I called her, it was nobody picking up, busy line all the time. When I said it to him, he said it is okay that he already got a statement from Dr. Fredy Matias about his, I guess hospitalization stay, etc., for his work. He is happy. Denies suicidal thoughts. He is worried about his missing daughter. He says he is not suicidal.  He says that he is going to the leave the alcohol alone. He says he is not going to touch alcohol after all these things that happened to him. He does plan to get a therapist and a psychiatrist.  He is very content with his medication, mirtazapine 15 mg at night and prazosin 1 mg at night and Zoloft. No side effect. He is up and walking and not suicidal.  He says he is going to get help.       Amanda Fink MD      [id number]  D:  2021 14:33  T:  2021 18:10  JOB #:  [id number]

## 2021-09-24 NOTE — PROGRESS NOTES
9/24/2021      RE: Rosenda Cheek      To Whom it May Concern: This is to certify that Rosenda Cheek may {return to work on Monday 09/27/2021  He was admitted from 09/21/2021 and discharge to this date. Please feel free to contact my office if you have any questions or concerns. Thank you for your assistance in this matter.     Sincerely,      Dr. Rin Jimenez

## 2021-09-24 NOTE — PROGRESS NOTES
Problem: General Infection Care Plan (Adult and Pediatric)  Goal: Improvement in signs and symptoms of infection  Outcome: Progressing Towards Goal  Goal: *Optimize nutritional status  Outcome: Progressing Towards Goal     Problem: Patient Education: Go to Patient Education Activity  Goal: Patient/Family Education  Outcome: Progressing Towards Goal     Problem: Suicide  Goal: *STG: Remains safe in hospital  Outcome: Progressing Towards Goal  Goal: *STG:  Verbalizes alternative ways of dealing with maladaptive feelings/behaviors  Outcome: Progressing Towards Goal  Goal: *STG/LTG: No longer expresses self destructive or suicidal thoughts  Outcome: Progressing Towards Goal     Problem: Patient Education: Go to Patient Education Activity  Goal: Patient/Family Education  Outcome: Progressing Towards Goal     Problem: Falls - Risk of  Goal: *Absence of Falls  Description: Document Antoinette Fall Risk and appropriate interventions in the flowsheet.   Outcome: Progressing Towards Goal  Note: Fall Risk Interventions:            Medication Interventions: Teach patient to arise slowly                   Problem: Patient Education: Go to Patient Education Activity  Goal: Patient/Family Education  Outcome: Progressing Towards Goal

## 2021-09-24 NOTE — SUICIDE SAFETY PLAN
SAFETY PLAN    A suicide Safety Plan is a document that supports someone when they are having thoughts of suicide. Warning Signs that indicate a suicidal crisis may be developing: What (situations, thoughts, feelings, body sensations, behaviors, etc.) do you experience that lets you know you are beginning to think about suicide? 1. When drinking alcohol  2.   3. Internal Coping Strategies:  What things can I do (relaxation techniques, hobbies, physical activities, etc.) to take my mind off my problems without contacting another person? 1. work  2.   3.    People and social settings that provide distraction: Who can I call or where can I go to distract me? 1. Name: Heather Salas  Phone: [phone]  2. Name:   Phone:   3. Place:             4. Place:    People whom I can ask for help: Who can I call when I need help - for example, friends, family, clergy, someone else? 1. Name: Yannick Morales                Phone:  2. Name:   Phone:  3. Name:  Phone:     Professionals or [address] [place] [address] I can contact during a crisis: Who can I call for help - for example, my doctor, my psychiatrist, my psychologist, a mental health provider, a suicide hotline? 1. Clinician Name:Dr. Neil Matos   Phone: [phone]      Clinician Pager or Emergency Contact #:    2. Clinician Name:    Phone:       Clinician Pager or Emergency Contact #:     3. Suicide Prevention Lifeline: 4-859-894-TALK (7180)    4. [address] [place] [address] Emergency Services -  for example, [facility] suicide hotline, [facility] Hotline:       Emergency Services Address:       Emergency Services Phone:     Making the environment safe: How can I make my environment (house/apartment/living space) safer? For example, can I remove guns, medications, and other items? 1. No negativity  2.

## 2021-09-24 NOTE — SUICIDE SAFETY PLAN
SAFETY PLAN    A suicide Safety Plan is a document that supports someone when they are having thoughts of suicide. Warning Signs that indicate a suicidal crisis may be developing: What (situations, thoughts, feelings, body sensations, behaviors, etc.) do you experience that lets you know you are beginning to think about suicide? 1. ***  2. ***  3. ***    Internal Coping Strategies:  What things can I do (relaxation techniques, hobbies, physical activities, etc.) to take my mind off my problems without contacting another person? 1. ***  2. ***  3. ***    People and social settings that provide distraction: Who can I call or where can I go to distract me? 1. Name: ***  Phone: ***  2. Name: ***  Phone: ***   3. Place: ***            4. Place: ***    People whom I can ask for help: Who can I call when I need help - for example, friends, family, clergy, someone else? 1. Name: ***                Phone: ***  2. Name: ***  Phone: ***  3. Name: ***  Phone: ***    Professionals or [facility] agencies I can contact during a crisis: Who can I call for help - for example, my doctor, my psychiatrist, my psychologist, a mental health provider, a suicide hotline? 1. Clinician Name: ***   Phone: ***      Clinician Pager or Emergency Contact #: ***    2. Clinician Name: ***   Phone: ***      Clinician Pager or Emergency Contact #: ***    3. Suicide Prevention Lifeline: 2-931-170-TALK (8744)    4. [address] [place] [address] Emergency Services -  for example, [address] [facility] suicide hotline, [facility] Hotline: ***      Emergency Services Address: ***      Emergency Services Phone: ***    Making the environment safe: How can I make my environment (house/apartment/living space) safer? For example, can I remove guns, medications, and other items?   1. ***  2. ***

## 2021-09-24 NOTE — DISCHARGE SUMMARY
Discharge Summary       PATIENT ID: Cat Callahan  MRN: [id number]   YOB: 1970    DATE OF ADMISSION: 9/21/2021  4:55 PM    DATE OF DISCHARGE:   PRIMARY CARE PROVIDER: None     ATTENDING PHYSICIAN: Curtis Halsted Mohiuddin  DISCHARGING PROVIDER: Curtis Halsted Mohiuddin      CONSULTATIONS: IP CONSULT TO PSYCHIATRY    PROCEDURES/SURGERIES: * No surgery found *    ADMITTING DIAGNOSES:    Patient Active Problem List    Diagnosis Date Noted    Pancreatitis 09/21/2021       DISCHARGE DIAGNOSES / PLAN:         Suicidal ideation   alcohol withdrawals  Intractable nausea vomiting likely from alcohol withdrawal  Acute pancreatitis        DISCHARGE MEDICATIONS:  Current Discharge Medication List      START taking these medications    Details   amLODIPine (NORVASC) 10 mg tablet Take 1 Tablet by mouth daily. Qty: 30 Tablet, Refills: 0  Start date: 4/11/2952      folic acid (FOLVITE) 1 mg tablet Take 1 Tablet by mouth daily. Qty: 30 Tablet, Refills: 0  Start date: 9/24/2021      mirtazapine (REMERON) 15 mg tablet Take 1 Tablet by mouth nightly. Qty: 30 Tablet, Refills: 0  Start date: 9/24/2021      prazosin (MINIPRESS) 1 mg capsule Take 1 Capsule by mouth nightly. Qty: 30 Capsule, Refills: 0  Start date: 9/24/2021      sertraline (ZOLOFT) 25 mg tablet Take 1 Tablet by mouth daily. Qty: 30 Tablet, Refills: 0  Start date: 9/24/2021               NOTIFY YOUR PHYSICIAN FOR ANY OF THE FOLLOWING:   Fever over 101 degrees for 24 hours. Chest pain, shortness of breath, fever, chills, nausea, vomiting, diarrhea, change in mentation, falling, weakness, bleeding. Severe pain or pain not relieved by medications. Or, any other signs or symptoms that you may have questions about.     DISPOSITION:  x  Home With:   OT  PT  HH  RN       Long term SNF/Inpatient Rehab    Independent/assisted living    Hospice    Other:       PATIENT CONDITION AT DISCHARGE: Stable      PHYSICAL EXAMINATION AT DISCHARGE:  General:          Alert, cooperative, no distress, appears stated age. HEENT:           Atraumatic, anicteric sclerae, pink conjunctivae                          No oral ulcers, mucosa moist, throat clear, dentition fair  Neck:               Supple, symmetrical  Lungs:             Clear to auscultation bilaterally. No Wheezing or Rhonchi. No rales. Chest wall:      No tenderness  No Accessory muscle use. Heart:              Regular  rhythm,  No  murmur   No edema  Abdomen:        Soft, non-tender. Not distended. Bowel sounds normal  Extremities:     No cyanosis. No clubbing,                            Skin turgor normal, Capillary refill normal  Skin:                Not pale. Not Jaundiced  No rashes   Psych:             Not anxious or agitated. Neurologic:      Alert, moves all extremities, answers questions appropriately and responds to commands     CT ABD PELV WO CONT   Final Result   1. Inflammation about the pancreas head and less so about the pancreas tail,   consistent with provided history of pancreatitis. 2. Fatty infiltrated liver. 3. Thickening versus nondistention of the distal left colon, proximal sigmoid   colon. Correlate for any symptoms of colitis. No results found for this or any previous visit (from the past 24 hour(s)). HOSPITAL COURSE:    Patient is a 52 y. o. year old male history of heavy alcohol use M to the ER with depression and suicidal ideation also history of posttraumatic stress disorder recently because of depression he is drinking heavily liquor every suicidal ideation came to emergency room seen by the ER physician initially plan for admitted to psych floor as patient lipase is elevated suspect acute pancreatitis patient was admitted to medical floor     Patient denies any abdominal pain no nausea no vomiting    Patient was treated with IV fluids monitor lipase level patient no abdominal pain no nausea vomiting initially started on clear liquid advance to soft diet patient has initially admitted because of suicidal seen by psychiatrist patient started on Remeron and Zoloft patient was cleared for discharge by the psychiatrist    This morning patient denies any chest pain shortness of breath nausea vomiting diarrhea no constipation no fever no chills    Advance her diet  If patient tolerates can be discharged home today  Detailed discussion with the patient regarding drinking alcohol alcoholic hepatitis and pancreatitis and cirrhosis  He understands very well he plan to quit drinking completely    Medication reconciliation done temperature patient 35 minutes 50% time spent in counseling and coordination of care      Signed:   Wili Waddell MD  9/24/2021  8:49 AM

## 2021-09-24 NOTE — DISCHARGE INSTRUCTIONS
Patient Education        Learning About Acute Pancreatitis  What is acute pancreatitis? The pancreas is an organ behind the stomach. It makes hormones like insulin that help control how your body uses sugar. It also makes enzymes that help you digest food. Usually these enzymes flow from the pancreas to the intestines. But if they leak into the pancreas, they can irritate it and cause pain and swelling. When this happens suddenly, it's called acute pancreatitis. What causes it? Most of the time, acute pancreatitis is caused by gallstones or by heavy alcohol use. But several other things can cause it, such as:  · High levels of fats in the blood. · An injury. · A problem after a surgery or a procedure. · Certain medicines. What are the symptoms? The main symptom is pain in the upper belly. The pain can be severe. You also may have a fever, nausea, or vomiting. Some people get so sick that they have problems breathing. They also may have low blood pressure. How is it diagnosed? Your doctor will diagnose pancreatitis with an exam and by looking at your lab tests. Your doctor may think that you have this problem based on your symptoms and where you have pain in your belly. You may have blood tests of enzymes called amylase and lipase. In pancreatitis, the level of these enzymes is usually much higher than normal.  You also may have imaging tests of the belly. These may include an ultrasound, a CT scan, or an MRI. A special MRI called MRCP can show images of the bile ducts. This test can be very helpful when gallstones are causing the problem. How is it treated? Treatment of acute pancreatitis usually takes place in the hospital. It focuses on taking care of pain and supporting your body while your pancreas heals. In severe cases, treatment may occur in an intensive care unit to support breathing, treat serious infections, or help raise very low blood pressure.   If a gallstone is causing the problem, the gallstone may need to be removed. This is done during a procedure called ERCP. The doctor puts a scope in your mouth and moves it gently through the stomach and into the ducts from the pancreas and gallbladder. The doctor is then able to see a stone and remove it. Sometimes the gallbladder, which makes gallstones, needs to be removed by surgery. People with pancreatitis often need a lot of fluid to help support their other organs and their blood pressure. They get fluids through a vein (intravenous, or IV). Instead of food by mouth, nutrition is sometimes given through a vein while the pancreas heals. Where can you learn more? Go to http://www.gray.com/  Enter K740 in the search box to learn more about \"Learning About Acute Pancreatitis. \"  Current as of: February 10, 2021               Content Version: 13.0  © 2091-5428 [employer]. Care instructions adapted under license by [employer] (which disclaims liability or warranty for this information). If you have questions about a medical condition or this instruction, always ask your healthcare professional. [place] [address] any warranty or liability for your use of this information. Patient Education        Learning About How to Get Help During a [address]     When you live with a mental health condition, there may be times when you lose your emotional balance. That can put your health or even your life at risk. It may be hard to know what to do when you're in the midst of a crisis, so it's helpful to think ahead. These steps can help you be ready. 1. Identify your sources of support. There are many places to turn when you're in a crisis. These are just a few ideas. You may have others. For example, you might contact:  ? A trusted friend or family member. Make a list of some people you can count on in a crisis. ? Your doctor or mental health professional.  ?  The [facility] (9-546.503.6837). Trained counselors are available 24/7 to advise you in a crisis. It's free, and it's confidential.  ? The [facility] ([facility]). During a crisis, you can text 821612 for free 24/7 support from a trained counselor. You can also call the [facility] (3-307.211.5497) or go online (www.go.org/help) to chat with a trained volunteer. It's a good idea to put these numbers in your phone. 2. Assess your situation. Have you dealt with a mental health crisis before? What happened then? What brought it on? What signs or symptoms did you have? Then think about what's happening now. What symptoms are you having? Are they like the ones you had before? Are they getting worse? Is there a chance that you could hurt yourself or someone else? This is also a good time to think about how you got through a previous crisis. What coping strategies helped you? Could you use them this time? 3. Get the help you need. Don't be afraid or ashamed to reach out for help. We all need support from time to time, and there are people who want to help. The sooner you get support, the sooner you'll get through a crisis. ? Get help right away if you think you could be in danger. One example is having a plan to harm yourself or someone else. But you know best which signs mean you're having a crisis. If you're not sure, get emergency help. Call 911 or go to an emergency room, and tell them you're having a mental health crisis. ? If you're struggling but not in danger now, reach out to your sources of support. For instance, call a trusted friend or relative. Tell them how you're feeling, and ask if they can be with you until you're doing better. Or call a crisis hotline for advice and support. ? Call your doctor or mental health professional too. They can help you decide on your next steps. You may need more or different treatment. When should you call for help? Call 911 anytime you think you may need emergency care. For example, call if:    · You feel you cannot stop from hurting yourself or someone else. Call your doctor now or seek immediate medical care if:    · You have one or more warning signs of suicide. For example, call if:  ? You feel like giving away your possessions. ? You use illegal drugs or drink alcohol heavily. ? You talk or write about death. This may include writing suicide notes and talking about guns, knives, or pills. ? You start to spend a lot of time alone or spend more time alone than usual.     · You hear voices.     · You start acting in an aggressive way that's not normal for you. Watch closely for changes in your health, and be sure to contact your doctor if you have any problems. Current as of: June 16, 2021               Content Version: 13.0  © 6065-5214 [employer]. Care instructions adapted under license by [employer] (which disclaims liability or warranty for this information). If you have questions about a medical condition or this instruction, always ask your healthcare professional. [place] [address] any warranty or liability for your use of this information. Patient Education        Suicidal Thoughts and Behavior: Care Instructions  Your Care Instructions  You have been seen by a doctor because you've had thoughts about killing yourself. Maybe you have tried to do it. This is much different from having fleeting thoughts of death, which many people have from time to time. Your doctor and support team will work hard to help keep you safe. Your team may include a , a , and a counselor. Most people who think about suicide don't want to die. They think suicide will end their problems and pain. People who consider suicide often feel hopeless, helpless, and worthless. These thoughts can make a person feel that there is no other choice.   But you do have a choice. Help is always available. The doctor and staff members are taking you and your pain very seriously. It is important to remember that there are people who are willing and able to talk with you about your suicidal thoughts. Treatment and close follow-up care can help you feel better about life. Thoughts of hopelessness and suicide may come from being depressed. Depression is a medical condition. When you have depression, there may be problems with activity levels in certain parts of your brain. Chemicals in your brain called neurotransmitters may be out of balance. But depression can be treated. Treatment for depression includes counseling, medicines, and lifestyle changes. With treatment, you can feel better. Follow-up care is a key part of your treatment and safety. Be sure to make and go to all appointments, and call your doctor if you are having problems. It's also a good idea to know your test results and keep a list of the medicines you take. How can you care for yourself at home? · Talk to someone. Reach out to family members, friends, your doctor, or a counselor. Be open and honest with them about your thoughts and feelings. · Be safe with medicines. Take your medicines exactly as prescribed. Call your doctor if you think you are having a problem with your medicine. · Avoid illegal drugs and alcohol. · Attend all counseling sessions recommended by your doctor. · Have someone take away sharp or dangerous objects, guns, and drugs from your home. · Keep the numbers for these national suicide hotlines: 9-603-847-TALK (9-821.532.9300) and 1-251-OIRIOPG (1-344.203.6561). When should you call for help? Call 911 anytime you think you may need emergency care. For example, call if:    · You feel you cannot stop from hurting yourself or someone else. Call your doctor now or seek immediate medical care if:    · You have one or more warning signs of suicide. For example, call if:  ?  You feel like giving away your possessions. ? You use illegal drugs or drink alcohol heavily. ? You talk or write about death. This may include writing suicide notes and talking about guns, knives, or pills. ? You start to spend a lot of time alone or spend more time alone than usual.     · You hear voices.     · You start acting in an aggressive way that's not normal for you. Watch closely for changes in your health, and be sure to contact your doctor if you have any problems. Where can you learn more? Go to http://www.gray.com/  Enter U390 in the search box to learn more about \"Suicidal Thoughts and Behavior: Care Instructions. \"  Current as of: June 16, 2021               Content Version: 13.0  © 6720-2433 [employer]. Care instructions adapted under license by [employer] (which disclaims liability or warranty for this information). If you have questions about a medical condition or this instruction, always ask your healthcare professional. [place] [address] any warranty or liability for your use of this information. Discharge Instructions       PATIENT ID: Steven Higginbotham  MRN: [id number]   YOB: 1970    DATE OF ADMISSION: 9/21/2021  4:55 PM    DATE OF DISCHARGE: 9/24/2021    PRIMARY CARE PROVIDER: None     ATTENDING PHYSICIAN: Patricia Peacock MD  DISCHARGING PROVIDER: Srinivasa Richardson MD    To contact this individual call [phone] and ask the  to page. If unavailable ask to be transferred the Adult Hospitalist Department.     DISCHARGE DIAGNOSES suicidal and pancreatitis    CONSULTATIONS: IP CONSULT TO PSYCHIATRY    PROCEDURES/SURGERIES: * No surgery found *    PENDING TEST RESULTS:   At the time of discharge the following test results are still pending: Labs    FOLLOW UP APPOINTMENTS:   Follow-up Information     Follow up With Specialties Details Why Contact Debbie Silva MD Family Medicine In 1 week [address] [facility] [address]  [phone]      None    None (395) Patient stated that they have no PCP             ADDITIONAL CARE RECOMMENDATIONS: Discussed about quit drinking    DIET: Cardiac Diet      ACTIVITY: Activity as tolerated    Wound care: Wound Care Order: submitted to [facility] Please view [email or web address]    EQUIPMENT needed: None      DISCHARGE MEDICATIONS:   See Medication Reconciliation Form    · It is important that you take the medication exactly as they are prescribed. · Keep your medication in the bottles provided by the pharmacist and keep a list of the medication names, dosages, and times to be taken in your wallet. · Do not take other medications without consulting your doctor. NOTIFY YOUR PHYSICIAN FOR ANY OF THE FOLLOWING:   Fever over 101 degrees for 24 hours. Chest pain, shortness of breath, fever, chills, nausea, vomiting, diarrhea, change in mentation, falling, weakness, bleeding. Severe pain or pain not relieved by medications. Or, any other signs or symptoms that you may have questions about.       DISPOSITION:    Home With:   OT  PT  HH  RN       SNF/Inpatient Rehab/LTAC    Independent/assisted living    Hospice    Other:         PROBLEM LIST Updated:  Yes  yes       Signed:   Yun Jain MD  9/24/2021  8:59 AM

## 2021-09-24 NOTE — PROGRESS NOTES
Discharge plan of care/case management plan validated with provider discharge order. Patient discharged home. IVs removed. Patient wheeled down safely. Patient education was given with no questions or concerns.

## 2022-10-26 ENCOUNTER — HOSPITAL ENCOUNTER (EMERGENCY)
Age: 52
Discharge: HOME | End: 2022-10-26
Payer: COMMERCIAL

## 2022-10-26 VITALS
HEIGHT: 74 IN | WEIGHT: 240 LBS | BODY MASS INDEX: 30.8 KG/M2 | OXYGEN SATURATION: 99 % | DIASTOLIC BLOOD PRESSURE: 101 MMHG | RESPIRATION RATE: 16 BRPM | SYSTOLIC BLOOD PRESSURE: 199 MMHG | HEART RATE: 104 BPM | TEMPERATURE: 99.1 F

## 2022-10-26 DIAGNOSIS — F32.0 CURRENT MILD EPISODE OF MAJOR DEPRESSIVE DISORDER, UNSPECIFIED WHETHER RECURRENT (HCC): Primary | ICD-10-CM

## 2022-10-26 PROCEDURE — 99282 EMERGENCY DEPT VISIT SF MDM: CPT

## 2022-10-26 NOTE — Clinical Note
[facility] EMERGENCY DEPT  [address] [place] [address] [place] [address]  [phone]    Work/School Note    Date: 10/26/2022    To Whom It May concern:    Jessica Turner was seen and treated today in the emergency room by the following provider(s):  Physician Assistant: Ramez Mayfield PA-C. Jessica Turner is excused from work/school on 10/26/22 and 10/27/22. He is medically clear to return to work/school on 10/28/2022.        Sincerely,          Humberto Vora PA-C

## 2022-10-27 NOTE — BSMART NOTE
BSMART assessment completed, and suicide risk level noted to be no. Primary Nurse Brisa Helms and ED provider LYRIC Grijalva notified. Concerns not observed. Security/Off- has not been notified.

## 2022-10-27 NOTE — ED TRIAGE NOTES
Patient states has been going thru depression and hasnt been able to talk to his daughter.  He needs a work note because he hasn't been to work at his part time job in 2-3 days because he did not want to take [facility] into the workforce\"

## 2022-10-27 NOTE — DISCHARGE INSTRUCTIONS
717 [facility]   [address], [place], [address] Crisis [phone]  *D-19 2000 [facility], 454 [facility] or Crisis [phone] Toll free [address], [address] or [phone] for [facility]   [address]ul[address]Carly Brisas 2117 Crisis [phone]  [address], [facility] [place], [address] Crisis [phone]  [address] Neck Multiple locations in [facility], 2255 E [facility], 3 [facility] [address], [address], [address] Crisis [phone]  [address], [facility]     Other [address] Providers with Sliding Scale or [address], [place], [address] [facility] [address]  [facility] 150 [facility], [address] [facility], [address]  [employer] [address], [facility] [place], 1515 [facility], [address]  [place] 5800 [facility], 1000 [facility] Psychiatry [phone]    Psychiatrists and Nurse Practitioners   (Most of these practices also have therapists)    [address] 100 [facility] [place], [address], [facility]5 Ibeth Bustamante 72, [place], [facility] 16 [address] [place], 3340 [place] 10 [place]  [facility] [phone], TNOKUOYRBIIHWW [phone], [place] [phone], and 2708 Faizan Singleton  4165 [facility] or [place] office call [phone] or [place] call [phone]  [address] 8935 1106 [facility],[address] 9, [place], 2801 [facility] 1465 [facility] [address] [place], 1421 [facility][note 3] 119 8550 S [facility], 1306 [facility] Virtual appts. [facility] [place] 174, [place], 3330 Ella Serna  *Chris Webber NP [place], 9601 [facility] Online   [facility] Georgiana Deocleciano [address] 1841, [place], [address]  Neuropsychiatric and Counseling 53 [facility], [place], 15 [facility]  610 [facility] Counseling [address], [place], 1550 [facility]              (Primarily therapy practice but there is NP here does medication management)  [place] 96 [facility][1], 14964 [facility]  7500 [facility], [address] Have [place] and [place] locations also. Virtual appointments also.     [facility] 529 [facility], [address]  [address] 201 [facility], 1035 [facility], 700 [facility] 621 , [place], [address] [phone] and [place] [phone]    Therapists  *Lester Pérez, [address] 2606 [facility] [address] [facility] 1 [place], 3800 [facility] [phone] or GTLBZIPW [phone]  *[facility]  2801 [place], [address] 2401 [facility] [address], [place] [address] [place], [place] 5546 [address] [facility] [address] Rose Gomez [address] [facility] [address] 224 [facility] [phone]   *[facility] [address] [place] 1682 [facility].  [address] Daija bradley, 403 N [facility]

## 2022-10-27 NOTE — BSMART NOTE
Comprehensive Assessment Form Part 1      Section I - Disposition    Major Depressive d/o (per patient)  History reviewed. No pertinent past medical history. The Medical Doctor to Psychiatrist conference was not completed. The Medical Doctor is in agreement with BSMART. The plan is medical clearance and discharge with resources. The on-call Psychiatrist consulted was Dr. Jhon Buitrago. The admitting Psychiatrist will be Dr. Jhon Buitrago. The admitting Diagnosis is n/a. The Payor source is [facility]. This writer reviewed the [address] in nursing flowsheet and the risk level assigned is no risk. Based on this assessment, the risk of suicide is no risk and the plan is medical clearance and discharge with resources. Section II - Integrated Summary  Summary:  per triage-- Patient states has been going thru depression and hasnt been able to talk to his daughter. He needs a work note because he hasn't been to work at his part time job in 2-3 days because he did not want to take \"Drama into the workforce\"     Patient seen in room 26 of the ED, dressed in regular clothing, denied si/hi/ah/vh. Patient notes that he is only here to get a note for his part time job for justification for missing work. He notes that his full time job understands but his part time job does not understand. Patient shared that he is doing well for himself and does not want to lose his job and go backwards. He noted that he just needs a few days to focus on himself. Patient denied having any current providers and denied the need for any outpatient resources. He reported that he just needs to spend time with God and things will be figured out. He noted that he did not think this would take all night and his only request was to get home to eat his dinner from [place] and relax in his own home. He shared that having a note saying he came to the hospital would bring peace of mind.  Patient does not meet criteria for inpatient admission. Disposition was discussed with ED provider LYRIC Canales and he was in agreement. The patienthas demonstrated mental capacity to provide informed consent. The information is given by the patient. The Chief Complaint is depression and need a doctor note for work. The Precipitant Factors are life stressors, no providers. Previous Hospitalizations: yes  The patient has not previously been in restraints. Current Psychiatrist and/or  is none. Lethality Assessment:    The potential for suicide noted by the following: none . The potential for homicide is not noted. The patient has not been a perpetrator of sexual or physical abuse. There are not pending charges. The patient is not felt to be at risk for self harm or harm to others. The attending nurse was advised that security has not been notified. Section III - Psychosocial  The patient's overall mood and attitude is calm and cooperative. Feelings of helplessness and hopelessness are not observed. Generalized anxiety is not observed. Panic is not observed. Phobias are not observed. Obsessive compulsive tendencies are not observed. Section IV - Mental Status Exam  The patient's appearance shows no evidence of impairment. The patient's behavior shows no evidence of impairment. The patient is oriented to time, place, person and situation. The patient's speech shows no evidence of impairment. The patient's mood is euthymic. The range of affect shows no evidence of impairment. The patient's thought content demonstrates no evidence of impairment. The thought process shows no evidence of impairment. The patient's perception shows no evidence of impairment. The patient's memory shows no evidence of impairment. The patient's appetite shows no evidence of impairment. The patient's sleep shows no evidence of impairment. The patient's insight shows no evidence of impairment.   The patient's judgement shows no evidence of impairment. Section V - Substance Abuse  The patient is not using substances. Section VI - Living Arrangements  The patient is single. The patient lives alone. The patient has 2 children. The patient does plan to return home upon discharge. The patient does not have legal issues pending. The patient's source of income comes from employment. Baptism and cultural practices have not been voiced at this time. The patient's greatest support comes from family and this person will not be involved with the treatment. The patient has not been in an event described as horrible or outside the realm of ordinary life experience either currently or in the past.  The patient has not been a victim of sexual/physical abuse. Section VII - Other Areas of Clinical Concern  The highest grade achieved is unk with the overall quality of school experience being described as n/a. The patient is currently employed and speaks [place] as a primary language. The patient has no communication impairments affecting communication. The patient's preference for learning can be described as: can read and write adequately.   The patient's hearing is normal.  The patient's vision is normal.      Dustin Santiago [facility]

## 2022-10-27 NOTE — ED PROVIDER NOTES
EMERGENCY DEPARTMENT HISTORY AND PHYSICAL EXAM      Date: 10/26/2022  Patient Name: Sana Foley    History of Presenting Illness     Chief Complaint   Patient presents with    Depression       History Provided By: Patient    HPI: Sana Foley, 47 y.o. male with a past medical history significant for depression who presents to the ED with cc of mental health problem. Patient presents with complaints of depression. States that he has been working 2 jobs. 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 States that he had \"does not feel like I can get ahead financially\". Patient also reports that he has not been sleeping as well as he typically does. Patient states that he came in today because he has missed work for the past 3 days and is requesting work note so he does not get fired. Patient specifically denies any SI, HI, AVH. Patient has no somatic complaints at time of evaluation. Reports otherwise being well. There are no other complaints, changes, or physical findings at this time. PCP: None    No current facility-administered medications on file prior to encounter. Current Outpatient Medications on File Prior to Encounter   Medication Sig Dispense Refill    amLODIPine (NORVASC) 10 mg tablet Take 1 Tablet by mouth daily. 30 Tablet 0    folic acid (FOLVITE) 1 mg tablet Take 1 Tablet by mouth daily. 30 Tablet 0    mirtazapine (REMERON) 15 mg tablet Take 1 Tablet by mouth nightly. 30 Tablet 0    prazosin (MINIPRESS) 1 mg capsule Take 1 Capsule by mouth nightly. 30 Capsule 0    sertraline (ZOLOFT) 25 mg tablet Take 1 Tablet by mouth daily. 30 Tablet 0       Past History     Past Medical History:  History reviewed. No pertinent past medical history. Past Surgical History:  No past surgical history on file. Family History:  History reviewed. No pertinent family history.     Social History:  Social History     Tobacco Use    Smoking status: Some Days    Smokeless tobacco: Never    Tobacco comments:     cigar   Vaping Use    Vaping Use: Never used   Substance Use Topics    Alcohol use: Yes     Comment: daily     Drug use: Not Currently     Types: Marijuana       Allergies:  No Known Allergies    Review of Systems   Review of Systems   Constitutional: Negative. Negative for chills, diaphoresis and fever. HENT: Negative. Negative for congestion, rhinorrhea and sore throat. Eyes: Negative. Negative for pain. Respiratory: Negative. Negative for cough, chest tightness, shortness of breath and wheezing. Cardiovascular: Negative. Negative for chest pain and palpitations. Gastrointestinal: Negative. Negative for abdominal pain, diarrhea, nausea and vomiting. Genitourinary: Negative. Negative for difficulty urinating, dysuria, frequency and hematuria. Musculoskeletal: Negative. Skin: Negative. Negative for rash. Neurological: Negative. Negative for dizziness, weakness, light-headedness, numbness and headaches. Psychiatric/Behavioral: Negative. All other systems reviewed and are negative. Physical Exam   Physical Exam  Vitals and nursing note reviewed. Constitutional:       General: He is not in acute distress. Appearance: Normal appearance. He is not ill-appearing or toxic-appearing. HENT:      Head: Normocephalic and atraumatic. Mouth/Throat:      Mouth: Mucous membranes are moist.   Eyes:      Extraocular Movements: Extraocular movements intact. Conjunctiva/sclera: Conjunctivae normal.      Pupils: Pupils are equal, round, and reactive to light. Cardiovascular:      Rate and Rhythm: Normal rate and regular rhythm. Pulses: Normal pulses. Heart sounds: Normal heart sounds. No murmur heard. No friction rub. No gallop. Pulmonary:      Effort: Pulmonary effort is normal. No respiratory distress. Breath sounds: Normal breath sounds. No wheezing, rhonchi or rales.    Abdominal:      General: Bowel sounds are normal. There is no distension. Palpations: Abdomen is soft. Tenderness: There is no abdominal tenderness. There is no guarding or rebound. Musculoskeletal:      Cervical back: Neck supple. Skin:     General: Skin is warm and dry. Capillary Refill: Capillary refill takes less than 2 seconds. Findings: No rash. Neurological:      General: No focal deficit present. Mental Status: He is alert and oriented to person, place, and time. Psychiatric:         Attention and Perception: He does not perceive auditory or visual hallucinations. Mood and Affect: Mood is depressed. Behavior: Behavior normal.         Thought Content: Thought content normal. Thought content does not include homicidal or suicidal ideation. Lab and Diagnostic Study Results   Labs -   No results found for this or any previous visit (from the past 12 hour(s)). Radiologic Studies -   @lastxrresult@  CT Results  (Last 48 hours)      None          CXR Results  (Last 48 hours)      None            Medical Decision Making and ED Course   Differential Diagnosis & Medical Decision Making Provider Note:   Patient presents with depression and requesting work note as he has missed the last 3 days of work. Denies SI, HI, AVH. No somatic complaints. DDx:  2/2 MDD, bipolar, drug induced, organic cause such as electrolyte anomoly or infection. Stable vitals and benign exam.  We will have behavioral health specialist evaluate patient and provide outpatient resources. We will also closely monitor while he is in ED.    - I am the first provider for this patient. I reviewed the vital signs, available nursing notes, past medical history, past surgical history, family history and social history. The patients presenting problems have been discussed, and they are in agreement with the care plan formulated and outlined with them.   I have encouraged them to ask questions as they arise throughout their visit.    Vital Signs-Reviewed the patient's vital signs. No data found. ED Course:   11:10 PM  Patient evaluated by behavioral health who have contracted for safety and cleared for discharge. Patient provided outpatient resources and referrals. He has been encouraged to follow-up or to return to ED sooner should his condition worsen. Patient voiced understanding agreement care plan as outlined. He has no new complaints or concerns and all his questions have been answered. Patient ready be discharged home. Patient longer tachycardic at time of discharge. ALCOHOL/SUBSTANCE ABUSE COUNSELING: Upon evaluation, pt endorsed recent alcohol/illicit drug use. For approximately 15 minutes, pt has been counseled on the dangers of alcohol and illicit drug use on their health, and they were encouraged to quit as soon as possible in order to decrease further risks to their health. Pt has conveyed their understanding of the risks involved should they continue to use these products. Procedures   Performed by: Wendy Burciaga PA-C  Procedures      Disposition   Disposition: DC- Adult Discharges: All of the diagnostic tests were reviewed and questions answered. Diagnosis, care plan and treatment options were discussed. The patient understands the instructions and will follow up as directed. The patients results have been reviewed with them. They have been counseled regarding their diagnosis. The patient verbally convey understanding and agreement of the signs, symptoms, diagnosis, treatment and prognosis and additionally agrees to follow up as recommended with their PCP in 24 - 48 hours. They also agree with the care-plan and convey that all of their questions have been answered.   I have also put together some discharge instructions for them that include: 1) educational information regarding their diagnosis, 2) how to care for their diagnosis at home, as well a 3) list of reasons why they would want to return to the ED prior to their follow-up appointment, should their condition change. DISCHARGE PLAN:  1. Current Discharge Medication List        CONTINUE these medications which have NOT CHANGED    Details   amLODIPine (NORVASC) 10 mg tablet Take 1 Tablet by mouth daily. Qty: 30 Tablet, Refills: 0      folic acid (FOLVITE) 1 mg tablet Take 1 Tablet by mouth daily. Qty: 30 Tablet, Refills: 0      mirtazapine (REMERON) 15 mg tablet Take 1 Tablet by mouth nightly. Qty: 30 Tablet, Refills: 0      prazosin (MINIPRESS) 1 mg capsule Take 1 Capsule by mouth nightly. Qty: 30 Capsule, Refills: 0      sertraline (ZOLOFT) 25 mg tablet Take 1 Tablet by mouth daily. Qty: 30 Tablet, Refills: 0           2. Follow-up Information       Follow up With Specialties Details Why Contact Info    Matthew Galindo MD Psychiatry Schedule an appointment as soon as possible for a visit  As needed [address] [place] [address] EMERGENCY DEPT Emergency Medicine  If symptoms worsen [address] [address]  [phone]          3. Return to ED if worse   4. Discharge Medication List as of 10/26/2022 11:15 PM        Remove if admitted/transferred    Diagnosis/Clinical Impression     Clinical Impression:   1. Current mild episode of major depressive disorder, unspecified whether recurrent ([facility]        Attestations: Tammy BAJWA PA-C, am the primary clinician of record. Please note that this dictation was completed with [employer], the computer voice recognition software. Quite often unanticipated grammatical, syntax, homophones, and other interpretive errors are inadvertently transcribed by the computer software. Please disregard these errors. Please excuse any errors that have escaped final proofreading. Thank you.